# Patient Record
(demographics unavailable — no encounter records)

---

## 2024-10-15 NOTE — PROCEDURE
[FreeTextEntry3] : Date of Service: 10/14/2024   Account: [id number]  Patient: BARBARA ZENG   YOB: 1947  Age: 77 year  Surgeon:      Christiano Agustin D.O.  Assistant:    None  Pre-Operative Diagnosis:         Cervical Radiculopathy (M54.12)  Post Operative Diagnosis:       Cervical Radiculopathy (M54.12)  Procedure:             Cervical (C7-T1) interlaminar epidural steroid injection under fluoroscopic guidance  Anesthesia:  MAC  This procedure was carried out using fluoroscopic guidance.  The risks and benefits of the procedure were discussed extensively with the patient.  The consent of the patient was obtained and the following procedure was performed. The patient was placed in the prone position on the fluoroscopy table and the area was prepped and draped in a sterile fashion.  A timeout was performed with all essential staff present and the site and side were verified.  The patient was placed in the prone position and optimized to patient comfort.  The cervical area was prepped and draped in a sterile fashion.  The fluoroscope visualized the C7-T1 interspace using slight cephalad-caudad angulation and this area was marked.  Using sterile technique the superficial skin was anesthetized with 1% Lidocaine.  A 20 gauge 3.5 inch Tuohy needle was advanced under fluoroscopy until ligament was engaged.  Using a contralateral oblique view, a "loss of resistance" to air technique was utilized in order to gain access to the epidural space.  After negative aspiration for heme and CSF, 1 cc of Omnipaque contrast was administered and the appropriate cervical epidurogram was obtained in the VAUGHN and A/P view as well as digital subtraction angiography.  A total injectate of 3 cc of preservative free normal saline and 10mg decadron was then injected into the epidural space while maintaining meaningful verbal contact with the patient.    The needle was subsequently removed.  Vital signs remained normal.  Pulse oximeter was used throughout the procedure and the patient's pulse and oxygen saturation remained within normal limits.  The patient tolerated the procedure well.  There were no complications.  The patient was instructed to apply ice over the injection sites for twenty minutes every two hours for the next 24 to 48 hours.  Disposition:       1. The patient was advised to F/U in 1-2 weeks to assess the response to the injection.      2. The patient was also instructed to contact me immediately if there were any concerns related to the procedure performed.

## 2024-10-30 NOTE — DISCUSSION/SUMMARY
[de-identified] : A discussion regarding available pain management treatment options occurred with the patient. These included interventional, rehabilitative, pharmacological, and alternative modalities. We will proceed with the following:   Interventional treatment options: Patient has cervical spinal stenosis.  1. Proceed with a left CMBB C3, C4, C5 w/o MAC Patient has cervical axial pain that is consistent with facet joint pathology.  A diagnostic temporary block with local anesthetic of the medial branch was performed and has resulted in at least a 50% reduction in pain for the duration of the specific local anesthetic effect.  The pain is not radicular and there is absence of nerve root compression.  There is no prior spinal fusion surgery at the level targeted.  The pain has failed to respond to three months of conservative therapy.    The patient has severe anxiety of procedures that necessitates monitored anesthesia care (MAC). The procedure performed will be close to major nerves, arteries, and spinal cord and/or joint structures. Due to the proximity of these structures, we need the patient to be still during the procedure.  With the help of MAC, this will be safely achieved and decrease the risk of any complications.   * Will target her facet joints once her radicular pain is under control.   Medications: 1. Ordered Pregabalin 50 mg q12h prn.   ISTOP Checked Reference # [ [id number] ]  We are going to start Pregabalin. Potential side effects were discussed which included dizziness, sedation, and pedal edema to name a few. If the patient cannot tolerate these side effects should they occur, then they will stop the medication. If the patient experiences sedation, they understand that they should not drive.   - Follow up in 2 weeks post injection.   I Luz Llanes attest that this documentation has been prepared under the direction and in the presence of provider Dr. Christiano Agustin.  The documentation recorded by the scribe in my presence, accurately reflects the service I personally performed, and the decisions made by me with my edits as appropriate.   Christiano Agustin, DO

## 2024-10-30 NOTE — PHYSICAL EXAM
[de-identified] : C spine  No rashes, erythema, edema noted TTP b/l cervical paraspinal and trapezius  Limited ROM with neck extension and b/l left and right rotation 2/2 to pain Positive facet loading bilaterally RUE: 5/5 strength, sensation in tact LUE: 5/5 strength, sensation in tact

## 2024-10-30 NOTE — DATA REVIEWED
[FreeTextEntry1] : MRI of the cervical spine taken @ [facility] on 9- showed right convex curvature of the cervical spine centered at C4-5 with a 2 mm anterolisthesis of C4 over C5 and C5 over C6. Multilevel degenerative changes of the cervical spine. There is severe canal stenosis at C4-5. Multilevel neural foraminal stenosis, moderate on the right at C2-3 and C4-5. Posttreatment changes in the neck are better evaluated on prior CT scan of the neck with contrast from January 2023.

## 2024-10-30 NOTE — DISCUSSION/SUMMARY
[de-identified] : A discussion regarding available pain management treatment options occurred with the patient. These included interventional, rehabilitative, pharmacological, and alternative modalities. We will proceed with the following:   Interventional treatment options: Patient has cervical spinal stenosis.  1. Proceed with a left CMBB C3, C4, C5 w/o MAC Patient has cervical axial pain that is consistent with facet joint pathology.  A diagnostic temporary block with local anesthetic of the medial branch was performed and has resulted in at least a 50% reduction in pain for the duration of the specific local anesthetic effect.  The pain is not radicular and there is absence of nerve root compression.  There is no prior spinal fusion surgery at the level targeted.  The pain has failed to respond to three months of conservative therapy.    The patient has severe anxiety of procedures that necessitates monitored anesthesia care (MAC). The procedure performed will be close to major nerves, arteries, and spinal cord and/or joint structures. Due to the proximity of these structures, we need the patient to be still during the procedure.  With the help of MAC, this will be safely achieved and decrease the risk of any complications.   * Will target her facet joints once her radicular pain is under control.   Medications: 1. Ordered Pregabalin 50 mg q12h prn.   ISTOP Checked Reference # [ [id number] ]  We are going to start Pregabalin. Potential side effects were discussed which included dizziness, sedation, and pedal edema to name a few. If the patient cannot tolerate these side effects should they occur, then they will stop the medication. If the patient experiences sedation, they understand that they should not drive.   - Follow up in 2 weeks post injection.   I Luz Llanes attest that this documentation has been prepared under the direction and in the presence of provider Dr. Christiano Agustin.  The documentation recorded by the scribe in my presence, accurately reflects the service I personally performed, and the decisions made by me with my edits as appropriate.   Christiano Agustin, DO

## 2024-10-30 NOTE — HISTORY OF PRESENT ILLNESS
[FreeTextEntry1] : Ms. Jacobs is a 77-year-old female presenting to establish care for her neck pain. She is accompanied by her granddaughter who is acting as a . She has been experiencing this pain for about 1 year with no inciting event. She denies any injuries or any recent trauma. The pain in her neck refers into the left arm with associated sharp, shooting, stabbing pains with numbness and tingling. She also notes weakness in her left arm and hand. She also is presenting with what seems to be frozen shoulder. She has significant limitation in range of motion. She can hardly look to the left or right due to the pain. Pain is present daily and significantly impacts her quality of life. She denies any changes in bowel/bladder habits, fevers/chills, recent weight loss or any saddle anesthesia. She had throat cancer in 1988 and underwent radiation therapy.

## 2024-10-30 NOTE — PHYSICAL EXAM
[de-identified] : C spine  No rashes, erythema, edema noted TTP b/l cervical paraspinal and trapezius  Limited ROM with neck extension and b/l left and right rotation 2/2 to pain Positive facet loading bilaterally RUE: 5/5 strength, sensation in tact LUE: 5/5 strength, sensation in tact

## 2024-11-04 NOTE — HISTORY OF PRESENT ILLNESS
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 She has had  bilateral carotid stenting . The right carotid stent is patent but it is thought theat the distal  right ICA is occluded . There is increased velocities int he distal left ICA which is being treated medically . Marquita has atypical CP not on exertion but only when she takes a breath in .

## 2024-11-04 NOTE — ASSESSMENT
[FreeTextEntry1] : The patient has RA , oropharyngeal CA S/P RT with PEG  with HTN  . She has some renal insuffiency as well. The patient has a PEG .The patient was admitted with left sided weakness and she had a carotid stent in her right CCA . Her symptoms have resolved . She had CT scan with chronic frontal and parietal infarcts .Subsequent work up showed that the right ICA was occluded and the left ICA had a high grade stenosis . The patient had a stent in the left ICA as well. She was on ASA and Plavix . Seen by vascular after having a GI bleed and she had her Plavix stopped .  She is no longer anemic . She has atypical chest pain which usually occurs intermittently when she takes a breath in somewhat pleuritic. She had a CXR ordered from her PCP . ECG is normal . No pain on exertion  .

## 2024-11-04 NOTE — REASON FOR VISIT
[Symptom and Test Evaluation] : symptom and test evaluation [Hypertension] : hypertension [Family Member] : family member [FreeTextEntry3] : Dr. Delvalle

## 2024-11-04 NOTE — CARDIOLOGY SUMMARY
[de-identified] : 12- Sinus Bradycardia NS T wave change  04/20/2023 NSR, normal ECG 5--9-2024 NSR normal ECG  11-9-2024 NSR normal ECG .  [de-identified] :  Adenosine Thallium No ischemia  [de-identified] : 1- Normal LV systolic functio mild MR mild TR mild AI . [de-identified] : 12-9-2021 Moderate keft ICA stenosis .

## 2024-11-17 NOTE — PROCEDURE
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  The risks and benefits of the procedure were discussed extensively with the patient.  The consent of the patient was obtained and the following procedure was performed. The patient was placed in the prone position on the fluoroscopy table and the area was prepped and draped in a sterile fashion.  A timeout was performed with all essential staff present and the site and side were verified.  The left C3, C4, C5 vertebral levels were identified and the fluoroscope left obliqued to reveal the "waste" of the left articular pillars at the indicated levels.  Then under fluoroscopic guidance, a 25 gauge, 3.5 inch spinal needles were advanced to waist of the lateral processes of the left C3, C4, C5 nerves.  Proper placement of the needles was confirmed in the lateral fluoroscopic view.  There was negative aspiration for blood or CSF at each level.  Then 0.5 mL of a mixture of 2.5 mL of 0.25% Marcaine and 10 mg of Dexamethasone was injected at each nerve while meaningful verbal contact was maintained with the patient.  The needle was subsequently removed.  Vital signs remained normal.  Pulse oximeter was used throughout the procedure and the patient's pulse and oxygen saturation remained within normal limits.  The patient tolerated the procedure well.  There were no complications.  The patient was instructed to apply ice over the injection sites for twenty minutes every two hours for the next 24 to 48 hours.  Disposition:       1. The patient was advised to F/U in 1-2 weeks to assess the response to the injection.       2. They were advised to keep a pain diary to report the results of the diagnostic block at their FUV.      3. The patient was also instructed to contact me immediately if there were any concerns related to the procedure performed.

## 2024-11-22 NOTE — PHYSICAL EXAM

## 2024-11-22 NOTE — HISTORY OF PRESENT ILLNESS
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  As per granddaughter, patient has lost 5 lbs in the lost month. States she eats 3 meals a day through her PEG tube. States she has a bowel movement every day with occasional straining. As per pt, her PEG tube is doing well, no complaints of redness or pain at the site. Patient denies any abdominal pain, nausea, vomiting, dysphagia, heart burn, diarrhea, melena, hematochezia.   Labs Reviewed 11/13/24 Hgb: 12.3 Hct:37.3 MCV: 93.3 PLt: 194 Bili: 0.4 Alk Phos: 105 AST: 26 ALT: 19   Previously noted, pt initially seen following hospitalization with rectal bleeding. EGD performed on 3/28/23 showed erosive gastritis. Colonoscopy showing diverticulosis and internal hemorrhoids otherwise was unremarkable. Pt feeling well overall, denies recurrent rectal bleeding or dark stools. She notes intermittent leakage around PEG site, able to tolerate feeds however notes leakage and mild erythema with slight tenderness around PEG site. Leakage appears to occur with certain positions. At prior visit advised course of antibiotics for possible peristomal cellulitis, which resolved symptoms. States she had recent PEG replacement in ED. Tolerating feeds otherwise. Advised G tube study which showed no obvious extravasation. Pt contacted by nutritionist and feed regimen reviewed and refilled. Denies abdominal pain or n/v. Reports regular bm, no blood in stools. Denies fever/chills. Taking iron supplementation intermittently. Denies family H/O gastric, colon, liver, pancreatic, or esophageal cancer.  Labs reviewed (3/2024): Hb 9.4-->11.6, mcv 92 Ferritin 13 LFTs normal

## 2024-11-22 NOTE — ASSESSMENT
[FreeTextEntry1] : 76yo female h/o CVA in January 2022, tonsillar cancer with RT in 1986 s/p PEG since 2006 due to dysphagia, anemia presents for follow up.   #Gastrostomy with intermittent peristomal leakage and mild erythema S/p prior course of antibiotics for peristomal cellulitis with improvement Recent gastrostomy replacement done in ED s/p recent G tube study without extravasation Appears to be functioning well, currently tolerating feeds possibly component of over feed at times, regimen reviewed with nutritionist -PPI daily -Keep PEG site clean, dry can use zinc oxide PRN as well on site.  -Flush before and after feeds -ED evaluation if new or recurrent symptoms including abd pain/tenderness, fever/chills, feed intolerance  #weight loss -Will have nutrition follow for possible regimen adjustment.  -Continue to monitor weights, will consider further testing/imaging #Iron deficiency anemia, no overt GI bleeding -Reviewed labs -Not currently taking PO iron, Hgb 12.3. Will repeat labs with iron studies in 3 months   Follow up 3 months, sooner if needed Pt/pts granddaughter agreeable with plan, advised to contact us if questions/concerns or if new/worsening symptoms.

## 2024-11-22 NOTE — END OF VISIT
[] : Fellow [FreeTextEntry3] :  Pt seen/examined, agree with above findings and plan as outlined by Olga Lidia Topete NP. [Time Spent: ___ minutes] : I have spent [unfilled] minutes of time on the encounter which excludes teaching and separately reported services.

## 2024-11-29 NOTE — HISTORY OF PRESENT ILLNESS
[FreeTextEntry1] : Ms. Jacobs is a 77-year-old female presenting to establish care for her neck pain. She is accompanied by her granddaughter who is acting as a . She has been experiencing this pain for about 1 year with no inciting event. She denies any injuries or any recent trauma. The pain in her neck refers into the left arm with associated sharp, shooting, stabbing pains with numbness and tingling. She also notes weakness in her left arm and hand. She also is presenting with what seems to be frozen shoulder. She has significant limitation in range of motion. She can hardly look to the left or right due to the pain. Pain is present daily and significantly impacts her quality of life. She denies any changes in bowel/bladder habits, fevers/chills, recent weight loss or any saddle anesthesia. She had throat cancer in 1988 and underwent radiation therapy.  11-: Revisit encounter. She is accompanied by her granddaughter today.   Since her last office visit, she underwent a Left C3, C4, C5 medial branch facet block on 11-. She affords ongoing 75% sustained relief. She now has minimal to no pain. She rates her pain at a 3/10 on the pain scale. She has not had to take any medications for the pain. The pain is only present first thing in the morning when getting out of bed.

## 2024-11-29 NOTE — PHYSICAL EXAM
[de-identified] : C spine  No rashes, erythema, edema noted TTP b/l cervical paraspinal and trapezius  Limited ROM with neck extension and b/l left and right rotation 2/2 to pain Positive facet loading bilaterally RUE: 5/5 strength, sensation in tact LUE: 5/5 strength, sensation in tact

## 2024-11-29 NOTE — DISCUSSION/SUMMARY
[de-identified] : A discussion regarding available pain management treatment options occurred with the patient. These included interventional, rehabilitative, pharmacological, and alternative modalities. We will proceed with the following:   Interventional treatment options: - None indicated at this time.  We discussed in detail potentially a second block and then explained the radiofrequency ablation procedure. The patient's family member is here to translate and explain to the patient  - Follow up as needed should her pain return  Total clinician time spent today on the patient is 20 minutes including preparing to see the patient, obtaining and/or reviewing and confirming history, performing medically necessary and appropriate examination, counseling and educating the patient and/or family, documenting clinical information in the EHR and communicating and/or referring to other healthcare professionals.   I Luz Llanes attest that this documentation has been prepared under the direction and in the presence of provider Dr. Christiano Agustin.  The documentation recorded by the scribe in my presence, accurately reflects the service I personally performed, and the decisions made by me with my edits as appropriate.   Christiano Agustin, DO

## 2024-11-29 NOTE — PHYSICAL EXAM
[de-identified] : C spine  No rashes, erythema, edema noted TTP b/l cervical paraspinal and trapezius  Limited ROM with neck extension and b/l left and right rotation 2/2 to pain Positive facet loading bilaterally RUE: 5/5 strength, sensation in tact LUE: 5/5 strength, sensation in tact

## 2024-11-29 NOTE — DISCUSSION/SUMMARY
[de-identified] : A discussion regarding available pain management treatment options occurred with the patient. These included interventional, rehabilitative, pharmacological, and alternative modalities. We will proceed with the following:   Interventional treatment options: - None indicated at this time.  We discussed in detail potentially a second block and then explained the radiofrequency ablation procedure. The patient's family member is here to translate and explain to the patient  - Follow up as needed should her pain return  Total clinician time spent today on the patient is 20 minutes including preparing to see the patient, obtaining and/or reviewing and confirming history, performing medically necessary and appropriate examination, counseling and educating the patient and/or family, documenting clinical information in the EHR and communicating and/or referring to other healthcare professionals.   I Luz Llanes attest that this documentation has been prepared under the direction and in the presence of provider Dr. Christiano Agustin.  The documentation recorded by the scribe in my presence, accurately reflects the service I personally performed, and the decisions made by me with my edits as appropriate.   Christiano Agustin, DO

## 2025-02-14 NOTE — HISTORY OF PRESENT ILLNESS
[FreeTextEntry1] : LAST VISIT - 11/22/24 76yo female h/o CVA in January 2022, tonsillar cancer with RT in 1986 s/p PEG since 2006 due to dysphagia, anemia presents for follow up.  Patient presents with granddaughter, who is translating. patient is doing well. no complaints. Tolerating feeds by PEG tube. States she eats 3 meals a day with occasional snack. States a few days ago, was put on antibiotics for possible PNA (amoxicillin, azithromycin), and has been having diarrhea, however it is slowly subsiding. Patient denies any abdominal pain, nausea, vomiting, dysphagia, heart burn, unintentional weight loss, constipation, melena, hematochezia. As per granddaughter, patient had lost 5 lbs in the last month at her last visit in November stable since then.   Labs Reviewed 11/13/24 Hgb: 12.3 Hct:37.3 MCV: 93.3 PLt: 194 Bili: 0.4 Alk Phos: 105 AST: 26 ALT: 19   Previously noted, pt initially seen following hospitalization with rectal bleeding. EGD performed on 3/28/23 showed erosive gastritis. Colonoscopy showing diverticulosis and internal hemorrhoids otherwise was unremarkable. Pt feeling well overall, denies recurrent rectal bleeding or dark stools. She notes intermittent leakage around PEG site, able to tolerate feeds however notes leakage and mild erythema with slight tenderness around PEG site. Leakage appears to occur with certain positions. At prior visit advised course of antibiotics for possible peristomal cellulitis, which resolved symptoms. States she had recent PEG replacement in ED. Tolerating feeds otherwise. Advised G tube study which showed no obvious extravasation. Pt contacted by nutritionist and feed regimen reviewed and refilled. Denies abdominal pain or n/v. Reports regular bm, no blood in stools. Denies fever/chills. Taking iron supplementation intermittently. Denies family H/O gastric, colon, liver, pancreatic, or esophageal cancer.  Labs reviewed (3/2024): Hb 9.4-->11.6, mcv 92 Ferritin 13 LFTs normal

## 2025-02-14 NOTE — ASSESSMENT
[FreeTextEntry1] : 78yo female h/o CVA in January 2022, tonsillar cancer with RT in 1986 s/p PEG since 2006 due to dysphagia, anemia presents for follow up.  #Gastrostomy with intermittent peristomal leakage and mild erythema S/p prior course of antibiotics for peristomal cellulitis with improvement. Mild erythema noted noted, will continue to monitor Recent gastrostomy replacement done in ED s/p recent G tube study without extravasation Appears to be functioning well, currently tolerating feeds possibly component of over feed at times, regimen reviewed with nutritionist -PPI daily, renewed pantoprazole -Keep PEG site clean, dry can use zinc oxide PRN as well on site. -Flush before and after feeds -ED evaluation if new or recurrent symptoms including abd pain/tenderness, fever/chills, feed intolerance  #weight loss, stable at this time  -Will have nutrition follow up to review feed regimen -Continue to monitor weights, will consider further testing/imaging if weight loss  #Iron deficiency anemia, no overt GI bleeding -Reviewed labs -Not currently taking PO iron, Hgb 12.3.  -Will repeat labs with iron studies   Follow up 6 weeks, sooner if needed Pt/pts granddaughter agreeable with plan, advised to contact us if questions/concerns or if new/worsening symptoms.

## 2025-02-14 NOTE — END OF VISIT
[FreeTextEntry3] :  Pt seen/examined, agree with above findings and plan as outlined by Olga Lidia Topete NP.  [Time Spent: ___ minutes] : I have spent [unfilled] minutes of time on the encounter which excludes teaching and separately reported services.

## 2025-02-14 NOTE — PHYSICAL EXAM
[Alert] : alert [Normal Voice/Communication] : normal voice/communication [Healthy Appearing] : healthy appearing [No Acute Distress] : no acute distress [Sclera] : the sclera and conjunctiva were normal [Hearing Threshold Finger Rub Not [place]] : hearing was normal [Normal Lips/Gums] : the lips and gums were normal [Oropharynx] : the oropharynx was normal [Normal Appearance] : the appearance of the neck was normal [No Neck Mass] : no neck mass was observed [No Respiratory Distress] : no respiratory distress [No Acc Muscle Use] : no accessory muscle use [Respiration, Rhythm And Depth] : normal respiratory rhythm and effort [Auscultation Breath Sounds / Voice Sounds] : lungs were clear to auscultation bilaterally [Heart Rate And Rhythm] : heart rate was normal and rhythm regular [Normal S1, S2] : normal S1 and S2 [Murmurs] : no murmurs [Bowel Sounds] : normal bowel sounds [Abdomen Tenderness] : non-tender [No Masses] : no abdominal mass palpated [Abdomen Soft] : soft [] : no hepatosplenomegaly [Oriented To Time, Place, And Person] : oriented to person, place, and time [de-identified] : PEG site clean, slight peristomal erythema, no purulence

## 2025-02-14 NOTE — HISTORY OF PRESENT ILLNESS
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  Patient presents with granddaughter, who is translating. patient is doing well. no complaints. Tolerating feeds by PEG tube. States she eats 3 meals a day with occasional snack. States a few days ago, was put on antibiotics for possible PNA (amoxicillin, azithromycin), and has been having diarrhea, however it is slowly subsiding. Patient denies any abdominal pain, nausea, vomiting, dysphagia, heart burn, unintentional weight loss, constipation, melena, hematochezia. As per granddaughter, patient had lost 5 lbs in the last month at her last visit in November stable since then.   Labs Reviewed 11/13/24 Hgb: 12.3 Hct:37.3 MCV: 93.3 PLt: 194 Bili: 0.4 Alk Phos: 105 AST: 26 ALT: 19   Previously noted, pt initially seen following hospitalization with rectal bleeding. EGD performed on 3/28/23 showed erosive gastritis. Colonoscopy showing diverticulosis and internal hemorrhoids otherwise was unremarkable. Pt feeling well overall, denies recurrent rectal bleeding or dark stools. She notes intermittent leakage around PEG site, able to tolerate feeds however notes leakage and mild erythema with slight tenderness around PEG site. Leakage appears to occur with certain positions. At prior visit advised course of antibiotics for possible peristomal cellulitis, which resolved symptoms. States she had recent PEG replacement in ED. Tolerating feeds otherwise. Advised G tube study which showed no obvious extravasation. Pt contacted by nutritionist and feed regimen reviewed and refilled. Denies abdominal pain or n/v. Reports regular bm, no blood in stools. Denies fever/chills. Taking iron supplementation intermittently. Denies family H/O gastric, colon, liver, pancreatic, or esophageal cancer.  Labs reviewed (3/2024): Hb 9.4-->11.6, mcv 92 Ferritin 13 LFTs normal

## 2025-02-14 NOTE — PHYSICAL EXAM

## 2025-02-14 NOTE — ASSESSMENT
[FreeTextEntry1] : 76yo female h/o CVA in January 2022, tonsillar cancer with RT in 1986 s/p PEG since 2006 due to dysphagia, anemia presents for follow up.  #Gastrostomy with intermittent peristomal leakage and mild erythema S/p prior course of antibiotics for peristomal cellulitis with improvement. Mild erythema noted noted, will continue to monitor Recent gastrostomy replacement done in ED s/p recent G tube study without extravasation Appears to be functioning well, currently tolerating feeds possibly component of over feed at times, regimen reviewed with nutritionist -PPI daily, renewed pantoprazole -Keep PEG site clean, dry can use zinc oxide PRN as well on site. -Flush before and after feeds -ED evaluation if new or recurrent symptoms including abd pain/tenderness, fever/chills, feed intolerance  #weight loss, stable at this time  -Will have nutrition follow up to review feed regimen -Continue to monitor weights, will consider further testing/imaging if weight loss  #Iron deficiency anemia, no overt GI bleeding -Reviewed labs -Not currently taking PO iron, Hgb 12.3.  -Will repeat labs with iron studies   Follow up 6 weeks, sooner if needed Pt/pts granddaughter agreeable with plan, advised to contact us if questions/concerns or if new/worsening symptoms.

## 2025-03-21 NOTE — PHYSICAL EXAM

## 2025-03-21 NOTE — END OF VISIT
[Time Spent: ___ minutes] : I have spent [unfilled] minutes of time on the encounter which excludes teaching and separately reported services. [FreeTextEntry3] :  Pt seen/examined, agree with above findings and plan as outlined by Olga Lidia Topete NP.

## 2025-03-21 NOTE — HISTORY OF PRESENT ILLNESS
[FreeTextEntry1] : LAST VISIT - 2/10/25 76yo female h/o CVA in January 2022, tonsillar cancer with RT in 1986 s/p PEG since 2006 due to dysphagia, anemia presents for follow up.   Patient presents with daughter, who is translating. patient is doing well. no complaints. Tolerating feeds by PEG tube. States she eats 3 meals a day with occasional snack. Pt has finished her antibiotics, diarrhea has resolved. Patient denies any abdominal pain, nausea, vomiting, heart burn, constipation, melena, hematochezia. Pt has lost 2 lbs in 2 the last 6 weeks. Current weight is now 95lbs.  Labs Reviewed 2/14/25 Hgb: 11.2 Hct: 33.6 Iron: 52 TIBC: 359 % Sat: 14 Ferritin: 127  Labs Reviewed 11/13/24 Hgb: 12.3 Hct:37.3 MCV: 93.3 PLt: 194 Bili: 0.4 Alk Phos: 105 AST: 26 ALT: 19   Previously noted, pt initially seen following hospitalization with rectal bleeding. EGD performed on 3/28/23 showed erosive gastritis. Colonoscopy showing diverticulosis and internal hemorrhoids otherwise was unremarkable. Pt feeling well overall, denies recurrent rectal bleeding or dark stools. She notes intermittent leakage around PEG site, able to tolerate feeds however notes leakage and mild erythema with slight tenderness around PEG site. Leakage appears to occur with certain positions. At prior visit advised course of antibiotics for possible peristomal cellulitis, which resolved symptoms. States she had recent PEG replacement in ED. Tolerating feeds otherwise. Advised G tube study which showed no obvious extravasation. Pt contacted by nutritionist and feed regimen reviewed and refilled. Denies abdominal pain or n/v. Reports regular bm, no blood in stools. Denies fever/chills. Taking iron supplementation intermittently. Denies family H/O gastric, colon, liver, pancreatic, or esophageal cancer.  Labs reviewed (3/2024): Hb 9.4-->11.6, mcv 92 Ferritin 13 LFTs normal

## 2025-03-21 NOTE — ASSESSMENT
[FreeTextEntry1] : 78yo female h/o CVA in January 2022, tonsillar cancer with RT in 1986 s/p PEG since 2006 due to dysphagia, anemia presents for follow up.  #Gastrostomy with intermittent peristomal leakage and mild erythema S/p prior course of antibiotics for peristomal cellulitis with improvement. Mild erythema noted, will continue to monitor Prior gastrostomy replacement done in ED s/p G tube study without extravasation Appears to be functioning well, currently tolerating feeds possibly component of over feed at times, regimen reviewed with nutritionist, will reassess -PPI daily, renewed pantoprazole -Keep PEG site clean, dry can use zinc oxide PRN as well on site. -Flush before and after feeds -ED evaluation if new or recurrent symptoms including abd pain/tenderness, fever/chills, feed intolerance  #weight loss, stable at this time -Will have nutrition follow up to review feed regimen -Continue to monitor weights, will consider further testing/imaging if weight loss -Will order CT Chest and Abdomen/Pelvis w. IV contrast pending heme/onc evaluation scheduled 3/31/25  #Iron deficiency anemia, no overt GI bleeding -Reviewed labs, pt following with Dr. Matthew next week.  -Not currently taking PO iron, Hgb 11.2 -Discussed iron supplementation pending heme/onc evaluation  Follow up in 2 months Pt/pts daughter agreeable with plan

## 2025-03-27 NOTE — ASSESSMENT
[FreeTextEntry1] : 76 y/o female with high grade left carotid artery stenosis and underwent transcarotid revascularization with angioplasty and stent (left TCAR) on 11/14/22. She underwent right carotid stent by Neurosurgery in July 2022.  Duplex showed patent right carotid artery stent without any restenosis, flow disturbance noted in the left distal carotid artery stent with velocity 155 cm/s.  I will continue conservative management and see her back in one year for repeat duplex.   I, Dr. Amando Dickson, personally performed the evaluation and management (E/M) services for this established patient who presents today with (a) new problem(s)/exacerbation of (an) existing condition(s). That E/M includes conducting the clinically appropriate interval history &/or exam, assessing all new/exacerbated conditions, and establishing a new plan of care. Today, my ERIKA, Luz Son PA-C, was here to observe my evaluation and management service for this new problem/exacerbated condition and follow the plan of care established by me going forward.

## 2025-03-27 NOTE — DATA REVIEWED
[FreeTextEntry1] : I performed a carotid duplex which was medically necessary to evaluate for patency of the carotid artery stents. It showed patent right carotid artery stent without any restenosis, flow disturbance noted in the left distal carotid artery stent with velocity 155 cm/s.

## 2025-03-27 NOTE — HISTORY OF PRESENT ILLNESS
[FreeTextEntry1] :  78 y/o female with high grade left carotid artery stenosis and underwent transcarotid revascularization with angioplasty and stent (left TCAR) on 11/14/22 due to her h/o radiation treatment to neck in the past.  She has h/o TIA in February 2022 with left sided weakness, found to have right common carotid artery stenosis and underwent right carotid stent by Neurosurgery in July 2022, but distal right internal carotid artery is occluded.

## 2025-03-31 NOTE — BEGINNING OF VISIT
[0] : 1) Little interest or pleasure doing things: Not at all (0) [1] : 2) Feeling down, depressed, or hopeless for several days (1) [[id number]] : 1 [Pain Scale: ___] : On a scale of 1-10, today the patient's pain is a(n) [unfilled]. [Never] : Never [Reviewed, no changes] : Reviewed, no changes

## 2025-03-31 NOTE — BEGINNING OF VISIT
[0] : 1) Little interest or pleasure doing things: Not at all (0) [1] : 2) Feeling down, depressed, or hopeless for several days (1) [[id number]] : 1 [Pain Scale: ___] : On a scale of 1-10, today the patient's pain is a(n) [unfilled]. [Never] : Never [Reviewed, no changes] : Reviewed, no changes

## 2025-03-31 NOTE — BEGINNING OF VISIT
[0] : 1) Little interest or pleasure doing things: Not at all (0) [1] : 2) Feeling down, depressed, or hopeless for several days (1) [[id number]] : 1 [Pain Scale: ___] : On a scale of 1-10, today the patient's pain is a(n) [unfilled]. [Never] : Never [Reviewed, no changes] : Reviewed, no changes

## 2025-03-31 NOTE — BEGINNING OF VISIT
[0] : 1) Little interest or pleasure doing things: Not at all (0) [1] : 2) Feeling down, depressed, or hopeless for several days (1) [[id number]] : 1 [Pain Scale: ___] : On a scale of 1-10, today the patient's pain is a(n) [unfilled]. [Never] : Never [Reviewed, no changes] : Reviewed, no changes

## 2025-04-02 NOTE — HISTORY OF PRESENT ILLNESS
[de-identified] : 77 year old female with h/o CVA in January 2022, tonsillar cancer with chemoRT in 1988, treatment complicated with severe fibrosis  s/p PEG since 2006 due to dysphagia is here for follow up of anemia.  She speaks Icelandic and granddaughter provided the translation. she was seen in the clinic in 2018 and 2020 to establish care given history of tonsillar cancer and was noted to have mild anemia with essentially negative workup. Review of recent labs showed hb ~11.2, Normal MCV, Normal wbc and platelet count, iron saturation of 14 %, ferritin 127. Normal creatinine.  Review of labs showed baseline Hb ~12 however in 2022 and early 2023  hb dropped to ~9 with low ferritin. EGD performed on 3/28/23 showed erosive gastritis. Colonoscopy showing diverticulosis and internal hemorrhoids otherwise was unremarkable. She frequently follows with GI due to PEG.   Denied any active bleeding. She was prescribed Iron pill  for anemia through peg tube but she said she is not taking it. She takes PPI. She reports weight loss but review showed she lost few pounds over years.

## 2025-04-02 NOTE — ASSESSMENT
[FreeTextEntry1] : 1. Mild Normocytic anemia ? Iron deficiency due to? PPI 2. Tonsillar cancer with chemoRT in 1988, treatment complicated with severe fibrosis  s/p PEG since 2006. 3. Chronic Mild weight loss noted but essentially stable   Plan She was advised to take PO iron prescribed. Labs today : will check CBC, Iron profile , ferritin, LDH, Myeloma workup, and reticulocyte count. CT CAP was ordered for weight loss by GI, she was advised to send a copy of results to the office.   Follow up based upon above results.

## 2025-04-02 NOTE — HISTORY OF PRESENT ILLNESS
[de-identified] : 77 year old female with h/o CVA in January 2022, tonsillar cancer with chemoRT in 1988, treatment complicated with severe fibrosis  s/p PEG since 2006 due to dysphagia is here for follow up of anemia.  She speaks Slovak and granddaughter provided the translation. she was seen in the clinic in 2018 and 2020 to establish care given history of tonsillar cancer and was noted to have mild anemia with essentially negative workup. Review of recent labs showed hb ~11.2, Normal MCV, Normal wbc and platelet count, iron saturation of 14 %, ferritin 127. Normal creatinine.  Review of labs showed baseline Hb ~12 however in 2022 and early 2023  hb dropped to ~9 with low ferritin. EGD performed on 3/28/23 showed erosive gastritis. Colonoscopy showing diverticulosis and internal hemorrhoids otherwise was unremarkable. She frequently follows with GI due to PEG.   Denied any active bleeding. She was prescribed Iron pill  for anemia through peg tube but she said she is not taking it. She takes PPI. She reports weight loss but review showed she lost few pounds over years.

## 2025-04-02 NOTE — HISTORY OF PRESENT ILLNESS
[de-identified] : 77 year old female with h/o CVA in January 2022, tonsillar cancer with chemoRT in 1988, treatment complicated with severe fibrosis  s/p PEG since 2006 due to dysphagia is here for follow up of anemia.  She speaks Maltese and granddaughter provided the translation. she was seen in the clinic in 2018 and 2020 to establish care given history of tonsillar cancer and was noted to have mild anemia with essentially negative workup. Review of recent labs showed hb ~11.2, Normal MCV, Normal wbc and platelet count, iron saturation of 14 %, ferritin 127. Normal creatinine.  Review of labs showed baseline Hb ~12 however in 2022 and early 2023  hb dropped to ~9 with low ferritin. EGD performed on 3/28/23 showed erosive gastritis. Colonoscopy showing diverticulosis and internal hemorrhoids otherwise was unremarkable. She frequently follows with GI due to PEG.   Denied any active bleeding. She was prescribed Iron pill  for anemia through peg tube but she said she is not taking it. She takes PPI. She reports weight loss but review showed she lost few pounds over years.

## 2025-04-02 NOTE — PHYSICAL EXAM
[Restricted in physically strenuous activity but ambulatory and able to carry out work of a light or sedentary nature] : Status 1- Restricted in physically strenuous activity but ambulatory and able to carry out work of a light or sedentary nature, e.g., light house work, office work [Thin] : thin [Normal] : affect appropriate [de-identified] : Peg tube noted.

## 2025-04-02 NOTE — HISTORY OF PRESENT ILLNESS
[de-identified] : 77 year old female with h/o CVA in January 2022, tonsillar cancer with chemoRT in 1988, treatment complicated with severe fibrosis  s/p PEG since 2006 due to dysphagia is here for follow up of anemia.  She speaks Bengali and granddaughter provided the translation. she was seen in the clinic in 2018 and 2020 to establish care given history of tonsillar cancer and was noted to have mild anemia with essentially negative workup. Review of recent labs showed hb ~11.2, Normal MCV, Normal wbc and platelet count, iron saturation of 14 %, ferritin 127. Normal creatinine.  Review of labs showed baseline Hb ~12 however in 2022 and early 2023  hb dropped to ~9 with low ferritin. EGD performed on 3/28/23 showed erosive gastritis. Colonoscopy showing diverticulosis and internal hemorrhoids otherwise was unremarkable. She frequently follows with GI due to PEG.   Denied any active bleeding. She was prescribed Iron pill  for anemia through peg tube but she said she is not taking it. She takes PPI. She reports weight loss but review showed she lost few pounds over years.

## 2025-04-02 NOTE — PHYSICAL EXAM
[Restricted in physically strenuous activity but ambulatory and able to carry out work of a light or sedentary nature] : Status 1- Restricted in physically strenuous activity but ambulatory and able to carry out work of a light or sedentary nature, e.g., light house work, office work [Thin] : thin [Normal] : affect appropriate [de-identified] : Peg tube noted.

## 2025-04-02 NOTE — REASON FOR VISIT
[Initial Consultation] : an initial consultation for [Follow-Up Visit] : a follow-up visit for [FreeTextEntry2] : Anemia

## 2025-04-02 NOTE — ASSESSMENT
OFFICE VISIT 1/5/2024    Chief Complaint   Patient presents with    Follow-up     6 month     HISTORY OF PRESENT ILLNESS:    Jose Alberto Matias is a 60 year old male with a past medical history of hypertension, sleep apnea, and diabetes mellitus type II. The patient presents today for a 6 month follow up.     Today, the patient presents to clinic unaccompanied and reports doing okay from a cardiac standpoint. He continues to have SCHAEFFER when walking, he attributes this to gaining weight. Able to do daily chores and activities as tolerated. Denies any chest pain, pressure, palpitations, lightheadedness, dizziness, or syncopal episodes. Reports needing a knee replacement but does not wish to do so because he is \"too old\". He notes that he has not taken his medications yet this morning, but plans to do so when he gets home.  No further complaints at this time.     PET Stress Test 12/22/2022  IMPRESSION:  1. Normal  myocardial perfusion scans at rest and following a regadenoson injection   2. Left ventricular function is normal at rest and following a regadenoson injection LVEF: 56% at rest and 60% post-regadenoson  3. Cardiac Risk Assessment:Low  4. No previous study for comparison.    US Aorta Duplex 11/18/2022  Exam Conclusions  There is no prior study immediately available for comparison.  The right BRITTNI performed today was normal at 0.97.  The left BRITTNI performed today was normal at 1.06.  Normal abdominal aorta without overt evidence of stenosis or aneurysmal dilatation.  Normal right common iliac artery without overt evidence of stenosis or  aneurysmal dilatation.  Normal left common iliac artery without overt evidence of stenosis or  aneurysmal dilatation.    Echo 11/08/2021  Left ventricular ejection fraction; 55 % normal systolic function. Normal cavity size and wall thickness, with no regional wall motion  abnormalities, grade 1 diastolic dysfunction, mildly elevated filling pressure, reduced global longitudinal  [FreeTextEntry1] : 1. Mild Normocytic anemia ? Iron deficiency due to? PPI 2. Tonsillar cancer with chemoRT in 1988, treatment complicated with severe fibrosis  s/p PEG since 2006. 3. Chronic Mild weight loss noted but essentially stable   Plan She was advised to take PO iron prescribed. Labs today : will check CBC, Iron profile , ferritin, LDH, Myeloma workup, and reticulocyte count. CT CAP was ordered for weight loss by GI, she was advised to send a copy of results to the office.   Follow up based upon above results. strain-15%.  Normal RV size and systolic function. TAPSE 2.3 cm RVSP could not be calculated due to incomplete tricuspid regurgitation velocity  profile.  Unremarkable valvular structures.  Mildly dilated proximal ascending aorta 3.7 cm.     NM Stress 01/31/2017  The gated SPECT study shows normal regional wall motion and thickening throughout the left ventricle. The LVEF is 58 %. (Normal >49%)  IMPRESSION:  1.  No evidence of significant ischemia with pharmacologic stress   2.  Normal resting left ventricular systolic function.   3. Cardiac Risk Assessment: Low.    4. Comparing to previous study, dated 7/24/2014, no significant change     Cholesterol (mg/dL)   Date Value   05/11/2020 209 (H)     HDL (mg/dL)   Date Value   05/11/2020 36 (L)     Cholesterol/ HDL Ratio (no units)   Date Value   05/11/2020 5.8 (H)     Triglycerides (mg/dL)   Date Value   05/11/2020 257 (H)     LDL (mg/dL)   Date Value   05/11/2020 122        Patient Active Problem List   Diagnosis    Tobacco use disorder    HTN (hypertension)    Bipolar 1 disorder (CMD)    SILVANA on CPAP    Dermatophytosis of nail    Back pain    Uncomplicated asthma    Benign neoplasm of colon    Benign localized hyperplasia of prostate without urinary obstruction and other lower urinary tract symptoms (LUTS)    Type 2 diabetes mellitus with complication, without long-term current use of insulin (CMS/HCC)    Allergic rhinitis    Hyperlipidemia    Chronic pain of left ankle    Major depressive disorder    Pain medication agreement completed    Chronic narcotic use-please review [place] Rx registry    Shortness of breath      I have personally reviewed and updated the following Epic sections Current medications, Allergies, Problem list, Past Medical History, Past Surgical History, Social History and Family History.    Medications:  Outpatient Medications Marked as Taking for the 1/5/24 encounter (Office Visit) with Vincent Zamudio MD   Medication Sig Dispense Refill     gabapentin (NEURONTIN) 300 MG capsule Take 1 capsule by mouth in the morning and 1 capsule at noon and 1 capsule in the evening. 90 capsule 0    lisinopril (ZESTRIL) 20 MG tablet Take 1 tablet by mouth every morning AND 0.5 tablets nightly. 135 tablet 3    doxazosin (CARDURA) 4 MG tablet Take 4 mg by mouth daily.      doxycycline monohydrate (MONODOX) 100 MG capsule 1t po bid.  Take with food. 84 capsule 0    pantoprazole (PROTONIX) 40 MG tablet Take 40 mg by mouth 2 times daily.      atorvastatin (LIPITOR) 40 MG tablet TAKE 1 TABLET BY MOUTH DAILY 90 tablet 1    meclizine (ANTIVERT) 25 MG tablet TK 1 OR 2 T PO BID PRN      hydrOXYzine (ATARAX) 25 MG tablet TK 1 T PO BID      doxazosin (CARDURA) 4 MG tablet TK 1 T PO  QHS  3    haloperidol (HALDOL) 2 MG tablet TK ONE T PO HS      metFORMIN (GLUCOPHAGE) 500 MG tablet Take 1 tablet by mouth 2 times daily (with meals). 180 tablet 1    hydrochlorothiazide (HYDRODIURIL) 25 MG tablet Take 1 tablet by mouth daily. 90 tablet 1    DULoxetine (CYMBALTA) 60 MG capsule Take 1 capsule by mouth daily. 30 capsule 5    aspirin 81 MG tablet Take 1 tablet by mouth daily.       PHYSICAL EXAMINATION:  Vitals:    01/05/24 0918   BP: (!) 160/90   Pulse: (!) 102     Review of Systems   Constitutional: Negative.   Cardiovascular:  Positive for dyspnea on exertion.   Respiratory: Negative.     Endocrine: Negative.    Hematologic/Lymphatic: Negative.    Skin: Negative.    Gastrointestinal: Negative.       CONSTITUTIONAL:  Well-developed, well-nourished.  PSYCHIATRIC/NEUROLOGIC:  Comfortable and in no apparent distress.  Alert and oriented x3.  In a good mood.  SKIN:  Soft, warm, and dry, without rashes or bruises.    LUNGS:  Respiratory effort is unlabored.  Lungs are clear without wheezing or crackles.  CARDIAC EXAM:  The PMI is non-displaced.  Auscultation reveals a normal S1, normal S2.  No S3 or S4.  No murmurs, clicks, or pericardial friction rub.   EXTREMITIES:  Without clubbing,  cyanosis or edema.  Femoral and pedal pulses intact.     ASSESSMENT/PLAN:    Dyspnea on Exertion: Attributed to deconditioning. Recent PET stress 12/2022 negative for ischemia. Recommend increased activity for weight loss, medication compliance, and smoking cessation. Will continue to monitor.     Hypertension: BP elevated in clinic, 160/90. Did not take his medications this morning. On lisinopril, hydrochlorothiazide, doxazosin. Advised proper medications compliance, low-salt diet, and increased exercise.      Hyperlipidemia: , CHOL 186, HDL 38, and  as of 8/09/2023. On atorvastatin 40 mg.      Diabetes Mellitus Type II: A1C of 7.1% as of 8/09/2023. On metformin. Managed by PCP.     Sleep Apnea: Very severe SILVANA per Sleep Study from 02/2013. Compliant with CPAP use. Follows with Dr. Muir.     Current Smoker: 1/2 ppd. Advised cessation.    Return in about 6 months (around 7/5/2024). or sooner if problems arise.    On 1/5/2024, Elvira FOURNIER scribed the services personally performed by Vincent Zamudio MD    The documentation recorded by the scribe accurately and completely reflects the service(s) I personally performed and the decisions made by me.

## 2025-04-02 NOTE — PHYSICAL EXAM
[Restricted in physically strenuous activity but ambulatory and able to carry out work of a light or sedentary nature] : Status 1- Restricted in physically strenuous activity but ambulatory and able to carry out work of a light or sedentary nature, e.g., light house work, office work [Thin] : thin [Normal] : affect appropriate [de-identified] : Peg tube noted.

## 2025-04-02 NOTE — PHYSICAL EXAM
[Restricted in physically strenuous activity but ambulatory and able to carry out work of a light or sedentary nature] : Status 1- Restricted in physically strenuous activity but ambulatory and able to carry out work of a light or sedentary nature, e.g., light house work, office work [Thin] : thin [Normal] : affect appropriate [de-identified] : Peg tube noted.

## 2025-05-05 NOTE — HISTORY OF PRESENT ILLNESS
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 She has had  bilateral carotid stenting . The right carotid stent is patent but it is thought theat the distal  right ICA is occluded . There is increased velocities int he distal left ICA which is being treated medically . She has left shoulder pain but the pleuritic pain she has not been having . The patient has a feeding tube

## 2025-05-05 NOTE — CARDIOLOGY SUMMARY
[de-identified] : 12- Sinus Bradycardia NS T wave change  04/20/2023 NSR, normal ECG 5--9-2024 NSR normal ECG  11-9-2024 NSR normal ECG .  [de-identified] :  Adenosine Thallium No ischemia  [de-identified] : 1- Normal LV systolic function mild MR mild TR mild AI . 1- EF 66% mild to mod MR mild to moderate TR RVSP was 39 mmhg IVC was 1.5 normal collapse  [de-identified] : 12-9-2021 Moderate keft ICA stenosis .

## 2025-05-05 NOTE — ASSESSMENT
[FreeTextEntry1] : The patient has RA , oropharyngeal CA S/P RT with PEG  with HTN  . She has some renal insuffiency as well. The patient has a PEG .The patient was admitted with left sided weakness and she had a carotid stent in her right CCA . Her symptoms have resolved . She had CT scan with chronic frontal and parietal infarcts .Subsequent work up showed that the right ICA was occluded and the left ICA had a high grade stenosis . The patient had a stent in the left ICA as well. She was on ASA and Plavix . Seen by vascular after having a GI bleed and she had her Plavix stopped .  She is no longer anemic . She has atypical chest pain which usually occurs intermittently when she takes a breath in somewhat pleuritic. She had a CXR ordered from her PCP . ECG is normal . No pain on exertion  .  She has not had further chest pain . She does have left shoulder pain . LDL has been <70 until this recent blood work. Amlodipine was stopped by her PCP

## 2025-05-14 NOTE — PHYSICAL EXAM
[de-identified] : C spine  No rashes, erythema, edema noted TTP b/l cervical paraspinal and trapezius muscles Positive spurlings L marked limitation w/ left sidebending/rotation facet loading + L RUE: 5/5 strength LUE: 5/5 strength

## 2025-05-14 NOTE — DISCUSSION/SUMMARY
[de-identified] : A discussion regarding available pain management treatment options occurred with the patient. These included interventional, rehabilitative, pharmacological, and alternative modalities. We will proceed with the following:   Interventional treatment options: 1. Proceed with a C7-T1 cervical epidural steroid injection under fluoroscopic guidance.  Treatment options were discussed. The patient has been having persistent, severe neck pain and cervical radicular pain that has minimally improved with conservative therapy thus far (including physical therapy/chiropractic manipulations/home stretching and anti-inflammatory medications for 8 weeks. The patient was given the option of proceeding with a cervical epidural steroid injection to try to get the patient some pain relief. The risks and benefits were discussed, which included the associated problems with steroids, bleeding, nerve injury, lack of improvement with pain, and 0.5% chance of a post dural puncture headache.   2. Done in office today: Cervical and trapezius trigger point injection.  The risks, benefits and alternatives of the proposed procedure were explained in detail with the patient.  The risks outlined include, but are not limited to, infection, bleeding, nerve injury, a temporary increase in pain, failure to resolve symptoms, allergic reaction, and possible elevation of blood sugar in diabetics.  All questions were answered to patient's apparent satisfaction and he/she verbalized an understanding.  Medications 1. Ordered Lidocaine 5% Topical patches to be used on and off q12h (30-day supply)  Complementary treatment options: - lifestyle modifications discussed - avoid bending and bend with knees - avoid heavy lifting  - Follow up 2 weeks post injection.  I Luz Llanes attest that this documentation has been prepared under the direction and in the presence of provider Dr. Christiano Agustin.  The documentation recorded by the scribe in my presence, accurately reflects the service I personally performed, and the decisions made by me with my edits as appropriate.   Christiano Agustin, DO

## 2025-05-14 NOTE — PHYSICAL EXAM
[Alert] : alert [No Acute Distress] : no acute distress [Sclera] : the sclera and conjunctiva were normal [Normal Appearance] : the appearance of the neck was normal [No Neck Mass] : no neck mass was observed [No Respiratory Distress] : no respiratory distress [No Acc Muscle Use] : no accessory muscle use [Respiration, Rhythm And Depth] : normal respiratory rhythm and effort [Auscultation Breath Sounds / Voice Sounds] : lungs were clear to auscultation bilaterally [Heart Rate And Rhythm] : heart rate was normal and rhythm regular [Normal S1, S2] : normal S1 and S2 [Bowel Sounds] : normal bowel sounds [Abdomen Tenderness] : non-tender [No Masses] : no abdominal mass palpated [Abdomen Soft] : soft [Oriented To Time, Place, And Person] : oriented to person, place, and time [Rebound Tenderness] : no rebound tenderness [de-identified] : PEG tube with brownish discoloration and stain at bumper, mild erythema at site, nontender, no fluctuance or palpable collection

## 2025-05-14 NOTE — ASSESSMENT
[FreeTextEntry1] : 78yo female h/o CVA in January 2022, tonsillar cancer with RT in 1986 s/p PEG since 2006 due to dysphagia, anemia presents for follow up.  #Gastrostomy with intermittent peristomal leakage and mild erythema S/p prior course of antibiotics for peristomal cellulitis with improvement.  Prior gastrostomy replacement done in ED s/p G tube study without extravasation Gastrostomy replacement  28Fr performed in office at pt request due to staining/discolored tubing, no leakage noted -Continue PPI daily -Keep PEG site clean, dry can use zinc oxide bid/prn -Flush before and after feeds -Will request dietician follow up -ED evaluation if new or recurrent symptoms including abd pain/tenderness, fever/chills, feed intolerance  #Weight loss, overall stable -Recent heme/onc evaluation noted, follow up as scheduled -CT chest performed/reviewed, abd/pelvis was denied, will hold off for now unless new symptoms or further weight loss -Continue to monitor weights -Repeat CT chest based on findings per PMD/oncology  #Iron deficiency anemia, no overt GI bleeding, Hb stable -Reviewed labs, pt following with Dr. Matthew next week.  -Iron supplementation per heme/onc  Follow up in 2 months Pt/pts [facility] agreeable with plan, advised to contact us if questions/concerns

## 2025-05-14 NOTE — HISTORY OF PRESENT ILLNESS
[FreeTextEntry1] : LAST VISIT - 3/21/25 78yo female h/o CVA in January 2022, tonsillar cancer with RT in 1986 s/p PEG since 2006 due to dysphagia, anemia presents for follow up.  Patient presents with dianne, who is translating. patient is doing well. No new complaints. Tolerating feeds by PEG tube, states tube is discolored and requesting exchange. Denies leakage. Has mild redness around gastrostomy site. Takes PPI daliy and using zinc oxide. Patient denies any abdominal pain, nausea, vomiting, heart burn, constipation, melena, hematochezia. Denies fever/chills. Pt had few lbs weight loss at prior visit, she attributed to prior pneumonia, overall appears stable, was seen by heme/onc. CT chest done, abd/pelvis was denied.  Labs Reviewed 2/14/25 Hgb: 11.2 Hct: 33.6 Iron: 52 TIBC: 359 % Sat: 14 Ferritin: 127   Previously noted, Pt initially seen following hospitalization with rectal bleeding. EGD performed on 3/28/23 showed erosive gastritis. Colonoscopy showing diverticulosis and internal hemorrhoids otherwise was unremarkable. Pt feeling well overall, denies recurrent rectal bleeding or dark stools. She notes intermittent leakage around PEG site, able to tolerate feeds however notes leakage and mild erythema with slight tenderness around PEG site. Leakage appears to occur with certain positions. At prior visit advised course of antibiotics for possible peristomal cellulitis, which resolved symptoms. States she had recent PEG replacement in ED. Tolerating feeds otherwise. Advised G tube study which showed no obvious extravasation. Pt contacted by nutritionist and feed regimen reviewed and refilled. Denies abdominal pain or n/v. Reports regular bm, no blood in stools. Denies fever/chills. Taking iron supplementation intermittently. Denies family H/O gastric, colon, liver, pancreatic, or esophageal cancer.  Labs reviewed (3/2024): Hb 9.4-->11.6, mcv 92 Ferritin 13 LFTs normal [de-identified] : 4/23/25

## 2025-05-14 NOTE — PHYSICAL EXAM
[Alert] : alert [No Acute Distress] : no acute distress [Sclera] : the sclera and conjunctiva were normal [Normal Appearance] : the appearance of the neck was normal [No Neck Mass] : no neck mass was observed [No Respiratory Distress] : no respiratory distress [No Acc Muscle Use] : no accessory muscle use [Respiration, Rhythm And Depth] : normal respiratory rhythm and effort [Auscultation Breath Sounds / Voice Sounds] : lungs were clear to auscultation bilaterally [Heart Rate And Rhythm] : heart rate was normal and rhythm regular [Normal S1, S2] : normal S1 and S2 [Bowel Sounds] : normal bowel sounds [Abdomen Tenderness] : non-tender [No Masses] : no abdominal mass palpated [Abdomen Soft] : soft [Oriented To Time, Place, And Person] : oriented to person, place, and time [Rebound Tenderness] : no rebound tenderness [de-identified] : PEG tube with brownish discoloration and stain at bumper, mild erythema at site, nontender, no fluctuance or palpable collection

## 2025-05-14 NOTE — HISTORY OF PRESENT ILLNESS
[FreeTextEntry1] : LAST VISIT - 3/21/25 78yo female h/o CVA in January 2022, tonsillar cancer with RT in 1986 s/p PEG since 2006 due to dysphagia, anemia presents for follow up.  Patient presents with dianne, who is translating. patient is doing well. No new complaints. Tolerating feeds by PEG tube, states tube is discolored and requesting exchange. Denies leakage. Has mild redness around gastrostomy site. Takes PPI daliy and using zinc oxide. Patient denies any abdominal pain, nausea, vomiting, heart burn, constipation, melena, hematochezia. Denies fever/chills. Pt had few lbs weight loss at prior visit, she attributed to prior pneumonia, overall appears stable, was seen by heme/onc. CT chest done, abd/pelvis was denied.  Labs Reviewed 2/14/25 Hgb: 11.2 Hct: 33.6 Iron: 52 TIBC: 359 % Sat: 14 Ferritin: 127   Previously noted, Pt initially seen following hospitalization with rectal bleeding. EGD performed on 3/28/23 showed erosive gastritis. Colonoscopy showing diverticulosis and internal hemorrhoids otherwise was unremarkable. Pt feeling well overall, denies recurrent rectal bleeding or dark stools. She notes intermittent leakage around PEG site, able to tolerate feeds however notes leakage and mild erythema with slight tenderness around PEG site. Leakage appears to occur with certain positions. At prior visit advised course of antibiotics for possible peristomal cellulitis, which resolved symptoms. States she had recent PEG replacement in ED. Tolerating feeds otherwise. Advised G tube study which showed no obvious extravasation. Pt contacted by nutritionist and feed regimen reviewed and refilled. Denies abdominal pain or n/v. Reports regular bm, no blood in stools. Denies fever/chills. Taking iron supplementation intermittently. Denies family H/O gastric, colon, liver, pancreatic, or esophageal cancer.  Labs reviewed (3/2024): Hb 9.4-->11.6, mcv 92 Ferritin 13 LFTs normal [de-identified] : 4/23/25

## 2025-05-14 NOTE — DISCUSSION/SUMMARY
[de-identified] : A discussion regarding available pain management treatment options occurred with the patient. These included interventional, rehabilitative, pharmacological, and alternative modalities. We will proceed with the following:   Interventional treatment options: 1. Proceed with a C7-T1 cervical epidural steroid injection under fluoroscopic guidance.  Treatment options were discussed. The patient has been having persistent, severe neck pain and cervical radicular pain that has minimally improved with conservative therapy thus far (including physical therapy/chiropractic manipulations/home stretching and anti-inflammatory medications for 8 weeks. The patient was given the option of proceeding with a cervical epidural steroid injection to try to get the patient some pain relief. The risks and benefits were discussed, which included the associated problems with steroids, bleeding, nerve injury, lack of improvement with pain, and 0.5% chance of a post dural puncture headache.   2. Done in office today: Cervical and trapezius trigger point injection.  The risks, benefits and alternatives of the proposed procedure were explained in detail with the patient.  The risks outlined include, but are not limited to, infection, bleeding, nerve injury, a temporary increase in pain, failure to resolve symptoms, allergic reaction, and possible elevation of blood sugar in diabetics.  All questions were answered to patient's apparent satisfaction and he/she verbalized an understanding.  Medications 1. Ordered Lidocaine 5% Topical patches to be used on and off q12h (30-day supply)  Complementary treatment options: - lifestyle modifications discussed - avoid bending and bend with knees - avoid heavy lifting  - Follow up 2 weeks post injection.  I Luz Llanes attest that this documentation has been prepared under the direction and in the presence of provider Dr. Christiano Agustin.  The documentation recorded by the scribe in my presence, accurately reflects the service I personally performed, and the decisions made by me with my edits as appropriate.   Christiano Agustin, DO

## 2025-05-14 NOTE — PROCEDURE
[Trigger point 1-2 muscle groups] : trigger point 1-2 muscle groups [Cervical paraspinal muscle] : cervical paraspinal muscle [Trapezius muscle] : trapezius muscle [Pain] : pain [Alcohol] : alcohol [Ethyl Chloride sprayed topically] : ethyl chloride sprayed topically [Sterile technique used] : sterile technique used [___ cc    0.5%] : Bupivacaine (Marcaine) ~Vcc of 0.5%  [___ cc    60mg] : Ketorolac (Toradol) ~Vcc of 60 mg  [____] : [unfilled] [] : Patient tolerated procedure well [Call if redness, pain or fever occur] : call if redness, pain or fever occur [Apply ice for 15min out of every hour for the next 12-24 hours as tolerated] : apply ice for 15 minutes out of every hour for the next 12-24 hours as tolerated [Previous OTC use and PT nontherapeutic] : patient has tried OTC's including aspirin, Ibuprofen, Aleve, etc or prescription NSAIDS, and/or exercises at home and/or physical therapy without satisfactory response [Risks, benefits, alternatives discussed / Verbal consent obtained] : the risks benefits, and alternatives have been discussed, and verbal consent was obtained [de-identified] : Chlorhexidine

## 2025-05-14 NOTE — HISTORY OF PRESENT ILLNESS
[FreeTextEntry1] : Ms. Jacobs is a 77-year-old female presenting to establish care for her neck pain. She is accompanied by her granddaughter who is acting as a . She has been experiencing this pain for about 1 year with no inciting event. She denies any injuries or any recent trauma. The pain in her neck refers into the left arm with associated sharp, shooting, stabbing pains with numbness and tingling. She also notes weakness in her left arm and hand. She also is presenting with what seems to be frozen shoulder. She has significant limitation in range of motion. She can hardly look to the left or right due to the pain. Pain is present daily and significantly impacts her quality of life. She denies any changes in bowel/bladder habits, fevers/chills, recent weight loss or any saddle anesthesia. She had throat cancer in 1988 and underwent radiation therapy.  11-: Revisit encounter. She is accompanied by her granddaughter today.   Since her last office visit, she underwent a Left C3, C4, C5 medial branch facet block on 11-. She affords ongoing 75% sustained relief. She now has minimal to no pain. She rates her pain at a 3/10 on the pain scale. She has not had to take any medications for the pain. The pain is only present first thing in the morning when getting out of bed.   5-: Revisit encounter. She is accompanied by her granddaughter today.   She is presenting today with a reoccurrence of neck pain. The pain in her neck refers into the left arm with associated sharp, shooting, stabbing pains with numbness and tingling. She also notes weakness in her left arm and hand. She also is presenting with what seems to be frozen shoulder. She has significant limitation in range of motion. She can hardly look to the left or right due to the pain. Pain is present daily and significantly impacts her quality of life. She rates the pain at a 8/10 on the pain scale.

## 2025-05-14 NOTE — PHYSICAL EXAM
[de-identified] : C spine  No rashes, erythema, edema noted TTP b/l cervical paraspinal and trapezius muscles Positive spurlings L marked limitation w/ left sidebending/rotation facet loading + L RUE: 5/5 strength LUE: 5/5 strength

## 2025-05-14 NOTE — PROCEDURE
[Trigger point 1-2 muscle groups] : trigger point 1-2 muscle groups [Cervical paraspinal muscle] : cervical paraspinal muscle [Trapezius muscle] : trapezius muscle [Pain] : pain [Alcohol] : alcohol [Ethyl Chloride sprayed topically] : ethyl chloride sprayed topically [Sterile technique used] : sterile technique used [___ cc    0.5%] : Bupivacaine (Marcaine) ~Vcc of 0.5%  [___ cc    60mg] : Ketorolac (Toradol) ~Vcc of 60 mg  [____] : [unfilled] [] : Patient tolerated procedure well [Call if redness, pain or fever occur] : call if redness, pain or fever occur [Apply ice for 15min out of every hour for the next 12-24 hours as tolerated] : apply ice for 15 minutes out of every hour for the next 12-24 hours as tolerated [Previous OTC use and PT nontherapeutic] : patient has tried OTC's including aspirin, Ibuprofen, Aleve, etc or prescription NSAIDS, and/or exercises at home and/or physical therapy without satisfactory response [Risks, benefits, alternatives discussed / Verbal consent obtained] : the risks benefits, and alternatives have been discussed, and verbal consent was obtained [de-identified] : Chlorhexidine

## 2025-05-14 NOTE — ASSESSMENT
[FreeTextEntry1] : 78yo female h/o CVA in January 2022, tonsillar cancer with RT in 1986 s/p PEG since 2006 due to dysphagia, anemia presents for follow up.  #Gastrostomy with intermittent peristomal leakage and mild erythema S/p prior course of antibiotics for peristomal cellulitis with improvement.  Prior gastrostomy replacement done in ED s/p G tube study without extravasation Gastrostomy replacement  28Fr performed in office at pt request due to staining/discolored tubing, no leakage noted -Continue PPI daily -Keep PEG site clean, dry can use zinc oxide bid/prn -Flush before and after feeds -Will request dietician follow up -ED evaluation if new or recurrent symptoms including abd pain/tenderness, fever/chills, feed intolerance  #Weight loss, overall stable -Recent heme/onc evaluation noted, follow up as scheduled -CT chest performed/reviewed, abd/pelvis was denied, will hold off for now unless new symptoms or further weight loss -Continue to monitor weights -Repeat CT chest based on findings per PMD/oncology  #Iron deficiency anemia, no overt GI bleeding, Hb stable -Reviewed labs, pt following with Dr. Matthew next week.  -Iron supplementation per heme/onc  Follow up in 2 months Pt/pts [facility] agreeable with plan, advised to contact us if questions/concerns

## 2025-06-19 NOTE — HISTORY OF PRESENT ILLNESS
[de-identified] : 77 year old female with h/o CVA in January 2022, tonsillar cancer with chemoRT in 1988, treatment complicated with severe fibrosis  s/p PEG since 2006 due to dysphagia is here for follow up of anemia.  She speaks Faroese and granddaughter provided the translation. she was seen in the clinic in 2018 and 2020 to establish care given history of tonsillar cancer and was noted to have mild anemia with essentially negative workup. Review of recent labs showed hb ~11.2, Normal MCV, Normal wbc and platelet count, iron saturation of 14 %, ferritin 127. Normal creatinine.  Review of labs showed baseline Hb ~12 however in 2022 and early 2023  hb dropped to ~9 with low ferritin. EGD performed on 3/28/23 showed erosive gastritis. Colonoscopy showing diverticulosis and internal hemorrhoids otherwise was unremarkable. She frequently follows with GI due to PEG.   Denied any active bleeding. She was prescribed Iron pill  for anemia through peg tube but she said she is not taking it. She takes PPI. She reports weight loss but review showed she lost few pounds over years.          [de-identified] : 6/19/25: Patient returns today with her granddaughter for follow-up visit for GABRIELLE. Pt overall feeling well, offering no complaints. Pt admits she recently had PEG replaced by GI with no issues. Pt denies fever, chills, N/V, abdominal pain, hematuria, hematochezia, fatigue, weakness, SOB, active bleeding/easy bruising.

## 2025-06-19 NOTE — PHYSICAL EXAM
[Restricted in physically strenuous activity but ambulatory and able to carry out work of a light or sedentary nature] : Status 1- Restricted in physically strenuous activity but ambulatory and able to carry out work of a light or sedentary nature, e.g., light house work, office work [Thin] : thin [Normal] : affect appropriate [de-identified] : Peg tube noted.

## 2025-06-19 NOTE — ASSESSMENT
[FreeTextEntry1] : Patient is a 77 year old female with history   #Mild Normocytic anemia ? Iron deficiency due to? PPI - taking PO iron every other day  - last labs March/2025 WBC 8.78 Hgb 12.2 PLTs 172 Ferritin 104  #Tonsillar cancer with chemoRT in 1988, treatment complicated with severe fibrosis  s/p PEG since 2006 - PEG recently exchanged by GI, no issues   #Chronic Mild weight loss noted but essentially stable   Plan:  -- labs today CBC, Ferritin, Iron panel  -- continue PO iron every other day  -- continue routine visits with GI  -- RTC in 6 months for follow-up with CBC, Ferritin  -- case discussed with Dr. Matthew, agrees with plan above

## 2025-06-25 NOTE — PROCEDURE
[FreeTextEntry3] : Date of Service: 06/23/2025   Account: [id number]  Patient: BARBARA ZENG   YOB: 1947  Age: 77 year  Surgeon:      Christiano Agustin D.O.  Assistant:    None  Pre-Operative Diagnosis:         Cervical Radiculopathy (M54.12)  Post Operative Diagnosis:       Cervical Radiculopathy (M54.12)  Procedure:             Cervical (C7-T1) interlaminar epidural steroid injection under fluoroscopic guidance  Anesthesia:  none  This procedure was carried out using fluoroscopic guidance.  The risks and benefits of the procedure were discussed extensively with the patient.  The consent of the patient was obtained and the following procedure was performed. The patient was placed in the prone position on the fluoroscopy table and the area was prepped and draped in a sterile fashion.  A timeout was performed with all essential staff present and the site and side were verified.  The patient was placed in the prone position and optimized to patient comfort.  The cervical area was prepped and draped in a sterile fashion.  The fluoroscope visualized the C7-T1 interspace using slight cephalad-caudad angulation and this area was marked.  Using sterile technique the superficial skin was anesthetized with 1% Lidocaine.  A 20 gauge 3.5 inch Tuohy needle was advanced under fluoroscopy until ligament was engaged.  Using a contralateral oblique view, a "loss of resistance" to air technique was utilized in order to gain access to the epidural space.  After negative aspiration for heme and CSF, 1 cc of Omnipaque contrast was administered and the appropriate cervical epidurogram was obtained in the VAUGHN and A/P view as well as digital subtraction angiography.  A total injectate of 3 cc of preservative free normal saline and 10mg decadron was then injected into the epidural space while maintaining meaningful verbal contact with the patient.    The needle was subsequently removed.  Vital signs remained normal.  Pulse oximeter was used throughout the procedure and the patient's pulse and oxygen saturation remained within normal limits.  The patient tolerated the procedure well.  There were no complications.  The patient was instructed to apply ice over the injection sites for twenty minutes every two hours for the next 24 to 48 hours.  Disposition:       1. The patient was advised to F/U in 1-2 weeks to assess the response to the injection.      2. The patient was also instructed to contact me immediately if there were any concerns related to the procedure performed.

## 2025-07-07 NOTE — END OF VISIT
[FreeTextEntry3] :  Pt seen/examined, agree with above findings and plan as outlined by Diane D'Amico, PA.  [Time Spent: ___ minutes] : I have spent [unfilled] minutes of time on the encounter which excludes teaching and separately reported services.

## 2025-07-07 NOTE — HISTORY OF PRESENT ILLNESS
[FreeTextEntry1] : LAST VISIT - 5/12/25 79yo female h/o CVA in January 2022, tonsillar cancer with RT in 1986 s/p PEG since 2006 due to dysphagia, anemia presents for follow up.  Patient presents with granddaughter, who is translating. patient is doing well. No new complaints. Tolerating feeds by PEG tube but states she still notes occasional leakage but not daily. Has mild redness around gastrostomy site. She completed antibiotics last month.  Takes PPI daily and using zinc oxide. She notes occasional constipation; she has a BM q day but with occasional straining. She takes miralax OD prn constipation with good response. Patient denies any abdominal pain, nausea, vomiting, heart burn, melena, hematochezia. Denies fever/chills. Pt had few lbs weight loss at prior visit, she attributed to prior pneumonia, overall appears stable, was seen by heme/onc. CT chest done, abd/pelvis was denied.  She was seen by Hem/Onc 6/19/25 and note reviewed; continue Iron QOD  Labs Reviewed 6/19/25: CBC Hgb 11.8 Iron 88 TIBC 356  % Sat 25  Ferritin 98  Labs reviewed 2/14/25: Hgb: 11.2 Hct: 33.6 Iron: 52 TIBC: 359 % Sat: 14 Ferritin: 127   Previously noted, Pt initially seen following hospitalization with rectal bleeding. EGD performed on 3/28/23 showed erosive gastritis. Colonoscopy showing diverticulosis and internal hemorrhoids otherwise was unremarkable. Pt feeling well overall, denies recurrent rectal bleeding or dark stools. She notes intermittent leakage around PEG site, able to tolerate feeds however notes leakage and mild erythema with slight tenderness around PEG site. Leakage appears to occur with certain positions. At prior visit advised course of antibiotics for possible peristomal cellulitis, which resolved symptoms. States she had recent PEG replacement in ED. Tolerating feeds otherwise. Advised G tube study which showed no obvious extravasation. Pt contacted by nutritionist and feed regimen reviewed and refilled. Denies abdominal pain or n/v. Reports regular bm, no blood in stools. Denies fever/chills. Taking iron supplementation intermittently. Denies family H/O gastric, colon, liver, pancreatic, or esophageal cancer.  Labs reviewed (3/2024): Hb 9.4-->11.6, mcv 92 Ferritin 13 LFTs normal   Radiology Summary   CT: 4/23/25.  Active Problems Adhesive capsulitis of left shoulder (726.0) (M75.02) Anemia (285.9) (D64.9) Arteriosclerosis of both carotid arteries (433.10,433.30) (I65.23) Carotid artery disease (447.9) (I77.9) Cellulitis (682.9) (L03.90) Cervical scoliosis (737.30) (M41.9) Cervical spondylosis with radiculopathy (721.0) (M47.22) Chest pain (7

## 2025-07-07 NOTE — PHYSICAL EXAM
[Alert] : alert [Normal Voice/Communication] : normal voice/communication [No Acute Distress] : no acute distress [Well Developed] : well developed [Underweight (BMI <= 18.5)] : underweight (BMI <= 18.5) [Sclera] : the sclera and conjunctiva were normal [Hearing Threshold Finger Rub Not [place]] : hearing was normal [Normal Appearance] : the appearance of the neck was normal [No Respiratory Distress] : no respiratory distress [No Acc Muscle Use] : no accessory muscle use [Respiration, Rhythm And Depth] : normal respiratory rhythm and effort [Auscultation Breath Sounds / Voice Sounds] : lungs were clear to auscultation bilaterally [Heart Rate And Rhythm] : heart rate was normal and rhythm regular [Normal S1, S2] : normal S1 and S2 [Bowel Sounds] : normal bowel sounds [Abdomen Soft] : soft [Epigastric] : epigastric [Abnormal Walk] : normal gait [Normal Color / Pigmentation] : normal skin color and pigmentation [Oriented To Time, Place, And Person] : oriented to person, place, and time [Rebound Tenderness] : no rebound tenderness [de-identified] : PEG intact with surrounding erythema, no current drainage noted, mild tenderness lateral to the PEG

## 2025-07-07 NOTE — ASSESSMENT
[FreeTextEntry1] : 79yo female h/o CVA in January 2022, tonsillar cancer with RT in 1986 s/p PEG since 2006 due to dysphagia, anemia presents for follow up.   #Gastrostomy with intermittent peristomal leakage and mild erythema S/p prior course of antibiotics for peristomal cellulitis with improvement but still with some erythema and intermittent leakage  Prior gastrostomy replacement done in ED s/p G tube study without extravasation Flushed water through the G tube and no leakage noted on exam today -Continue PPI daily -Keep PEG site clean, dry, can use zinc oxide bid/prn -Flush before and after feeds -Will request dietician follow up; will have Katiana call patient  -Will get a CT scan of Abd/Pelvis with contrast through the G tube to assess the leakage as well as for the weight loss  -ED evaluation if new or recurrent symptoms including abd pain/tenderness, fever/chills, feed intolerance -Will consider gastrostomy replacement/downsizing if recurrent leakage  #Weight loss, overall stable -Recent heme/onc evaluation noted, follow up as scheduled, CT chest done -CT chest performed/reviewed, abd/pelvis was denied, will hold off for now unless new symptoms or further weight loss -Continue to monitor weights -CT Abd/Pelvis via G tube as noted above   #Constipation Last colonoscopy 3/2023 showing mild diverticulosis, int hemorrhoids otherwise unremarkable -She was told to take the miralax OD prn -Avoid straining/constipation  #Iron deficiency anemia, no overt GI bleeding, Hb stable -Reviewed labs, pt following with Dr. Matthew -Iron supplementation per heme/onc  F/U in 1 month  Pt/pts granddaughter agreeable with plan, advised to contact us if questions/concerns

## 2025-07-13 NOTE — HISTORY OF PRESENT ILLNESS
[FreeTextEntry1] : Ms. Jacobs is a 77-year-old female presenting to establish care for her neck pain. She is accompanied by her granddaughter who is acting as a . She has been experiencing this pain for about 1 year with no inciting event. She denies any injuries or any recent trauma. The pain in her neck refers into the left arm with associated sharp, shooting, stabbing pains with numbness and tingling. She also notes weakness in her left arm and hand. She also is presenting with what seems to be frozen shoulder. She has significant limitation in range of motion. She can hardly look to the left or right due to the pain. Pain is present daily and significantly impacts her quality of life. She denies any changes in bowel/bladder habits, fevers/chills, recent weight loss or any saddle anesthesia. She had throat cancer in 1988 and underwent radiation therapy.  11-: Revisit encounter. She is accompanied by her granddaughter today.   Since her last office visit, she underwent a Left C3, C4, C5 medial branch facet block on 11-. She affords ongoing 75% sustained relief. She now has minimal to no pain. She rates her pain at a 3/10 on the pain scale. She has not had to take any medications for the pain. The pain is only present first thing in the morning when getting out of bed.   5-: Revisit encounter. She is accompanied by her granddaughter today.   She is presenting today with a reoccurrence of neck pain. The pain in her neck refers into the left arm with associated sharp, shooting, stabbing pains with numbness and tingling. She also notes weakness in her left arm and hand. She also is presenting with what seems to be frozen shoulder. She has significant limitation in range of motion. She can hardly look to the left or right due to the pain. Pain is present daily and significantly impacts her quality of life. She rates the pain at a 8/10 on the pain scale.   Revisit encounter (7/9/25) She is accompanied by her granddaughter today which is acting as a .  Pt is here for a follow up, she underwent SHAQUILLE C7-T1 which was done on 6/23/25 which gave her 65% relief to date, she states that overall her neck and some of her radiating symptoms feels better, she states that she has some residual pain that goes down her LT arm which is associated with numbness and tingling. Pain is 6/10 on a pain scale.

## 2025-07-13 NOTE — PHYSICAL EXAM
[de-identified] : C spine  No rashes, erythema, edema noted TTP b/l cervical paraspinal and trapezius muscles Positive spurlings L marked limitation w/ left sidebending/rotation facet loading + L RUE: 5/5 strength LUE: 5/5 strength

## 2025-07-13 NOTE — DISCUSSION/SUMMARY
[de-identified] : A discussion regarding available pain management treatment options occurred with the patient. These included interventional, rehabilitative, pharmacological, and alternative modalities. We will proceed with the following:   Interventional/ Procedures: 1. None indicated at this time.   Rehabilitative/alternative modalities: 1. Initiate physician directed activity and lifestyle modifications. 2. Participation in active HEP was discussed and printed. 3. Advised not to sleep in fetal position. I advised to sleep with their neck in a straight position with one pillow.  Total clinician time spent today on the patient is 30 minutes including preparing to see the patient, obtaining and/or reviewing and confirming history, performing medically necessary and appropriate examination, counseling and educating the patient and/or family, documenting clinical information in the EHR and communicating and/or referring to other healthcare professionals.  F/u as needed   SHAHID Matos, DO

## 2025-07-13 NOTE — PHYSICAL EXAM
[de-identified] : C spine  No rashes, erythema, edema noted TTP b/l cervical paraspinal and trapezius muscles Positive spurlings L marked limitation w/ left sidebending/rotation facet loading + L RUE: 5/5 strength LUE: 5/5 strength

## 2025-07-13 NOTE — DISCUSSION/SUMMARY
[de-identified] : A discussion regarding available pain management treatment options occurred with the patient. These included interventional, rehabilitative, pharmacological, and alternative modalities. We will proceed with the following:   Interventional/ Procedures: 1. None indicated at this time.   Rehabilitative/alternative modalities: 1. Initiate physician directed activity and lifestyle modifications. 2. Participation in active HEP was discussed and printed. 3. Advised not to sleep in fetal position. I advised to sleep with their neck in a straight position with one pillow.  Total clinician time spent today on the patient is 30 minutes including preparing to see the patient, obtaining and/or reviewing and confirming history, performing medically necessary and appropriate examination, counseling and educating the patient and/or family, documenting clinical information in the EHR and communicating and/or referring to other healthcare professionals.  F/u as needed   SHAHID Matos, DO